# Patient Record
Sex: MALE | Race: WHITE
[De-identification: names, ages, dates, MRNs, and addresses within clinical notes are randomized per-mention and may not be internally consistent; named-entity substitution may affect disease eponyms.]

---

## 2018-10-19 ENCOUNTER — HOSPITAL ENCOUNTER (OUTPATIENT)
Dept: HOSPITAL 76 - ED | Age: 33
Discharge: HOME | End: 2018-10-19
Attending: SURGERY
Payer: COMMERCIAL

## 2018-10-19 VITALS — SYSTOLIC BLOOD PRESSURE: 124 MMHG | DIASTOLIC BLOOD PRESSURE: 64 MMHG

## 2018-10-19 DIAGNOSIS — K35.80: Primary | ICD-10-CM

## 2018-10-19 LAB
ALBUMIN DIAFP-MCNC: 4.7 G/DL (ref 3.2–5.5)
ALBUMIN/GLOB SERPL: 1.3 {RATIO} (ref 1–2.2)
ALP SERPL-CCNC: 58 IU/L (ref 42–121)
ALT SERPL W P-5'-P-CCNC: 21 IU/L (ref 10–60)
ANION GAP SERPL CALCULATED.4IONS-SCNC: 9 MMOL/L (ref 6–13)
AST SERPL W P-5'-P-CCNC: 22 IU/L (ref 10–42)
BASOPHILS NFR BLD AUTO: 0 10^3/UL (ref 0–0.1)
BASOPHILS NFR BLD AUTO: 0.4 %
BILIRUB BLD-MCNC: 0.9 MG/DL (ref 0.2–1)
BUN SERPL-MCNC: 14 MG/DL (ref 6–20)
CALCIUM UR-MCNC: 9.4 MG/DL (ref 8.5–10.3)
CHLORIDE SERPL-SCNC: 102 MMOL/L (ref 101–111)
CLARITY UR REFRACT.AUTO: CLEAR
CO2 SERPL-SCNC: 28 MMOL/L (ref 21–32)
CREAT SERPLBLD-SCNC: 0.9 MG/DL (ref 0.6–1.2)
EOSINOPHIL # BLD AUTO: 0.1 10^3/UL (ref 0–0.7)
EOSINOPHIL NFR BLD AUTO: 0.6 %
ERYTHROCYTE [DISTWIDTH] IN BLOOD BY AUTOMATED COUNT: 13.8 % (ref 12–15)
GFRSERPLBLD MDRD-ARVRAT: 97 ML/MIN/{1.73_M2} (ref 89–?)
GLOBULIN SER-MCNC: 3.7 G/DL (ref 2.1–4.2)
GLUCOSE SERPL-MCNC: 102 MG/DL (ref 70–100)
GLUCOSE UR QL STRIP.AUTO: NEGATIVE MG/DL
HGB UR QL STRIP: 15.7 G/DL (ref 14–18)
KETONES UR QL STRIP.AUTO: NEGATIVE MG/DL
LIPASE SERPL-CCNC: 25 U/L (ref 22–51)
LYMPHOCYTES # SPEC AUTO: 1.6 10^3/UL (ref 1.5–3.5)
LYMPHOCYTES NFR BLD AUTO: 16.2 %
MCH RBC QN AUTO: 31.5 PG (ref 27–31)
MCHC RBC AUTO-ENTMCNC: 34.9 G/DL (ref 32–36)
MCV RBC AUTO: 90.4 FL (ref 80–94)
MONOCYTES # BLD AUTO: 0.5 10^3/UL (ref 0–1)
MONOCYTES NFR BLD AUTO: 5.4 %
NEUTROPHILS # BLD AUTO: 7.8 10^3/UL (ref 1.5–6.6)
NEUTROPHILS # SNV AUTO: 10 X10^3/UL (ref 4.8–10.8)
NEUTROPHILS NFR BLD AUTO: 77.4 %
NITRITE UR QL STRIP.AUTO: POSITIVE
PDW BLD AUTO: 9 FL (ref 7.4–11.4)
PH UR STRIP.AUTO: 6 PH (ref 5–7.5)
PLATELET # BLD: 186 10^3/UL (ref 130–450)
PROT SPEC-MCNC: 8.4 G/DL (ref 6.7–8.2)
PROT UR STRIP.AUTO-MCNC: NEGATIVE MG/DL
RBC # UR STRIP.AUTO: NEGATIVE /UL
RBC MAR: 4.96 10^6/UL (ref 4.7–6.1)
SODIUM SERPLBLD-SCNC: 139 MMOL/L (ref 135–145)
SP GR UR STRIP.AUTO: <=1.005 (ref 1–1.03)
SQUAMOUS URNS QL MICRO: (no result)
UROBILINOGEN UR QL STRIP.AUTO: (no result) E.U./DL
UROBILINOGEN UR STRIP.AUTO-MCNC: NEGATIVE MG/DL

## 2018-10-19 PROCEDURE — 85025 COMPLETE CBC W/AUTO DIFF WBC: CPT

## 2018-10-19 PROCEDURE — 74177 CT ABD & PELVIS W/CONTRAST: CPT

## 2018-10-19 PROCEDURE — 99284 EMERGENCY DEPT VISIT MOD MDM: CPT

## 2018-10-19 PROCEDURE — 81001 URINALYSIS AUTO W/SCOPE: CPT

## 2018-10-19 PROCEDURE — 83690 ASSAY OF LIPASE: CPT

## 2018-10-19 PROCEDURE — 99283 EMERGENCY DEPT VISIT LOW MDM: CPT

## 2018-10-19 PROCEDURE — 0DTJ4ZZ RESECTION OF APPENDIX, PERCUTANEOUS ENDOSCOPIC APPROACH: ICD-10-PCS | Performed by: SURGERY

## 2018-10-19 PROCEDURE — 80053 COMPREHEN METABOLIC PANEL: CPT

## 2018-10-19 PROCEDURE — 81003 URINALYSIS AUTO W/O SCOPE: CPT

## 2018-10-19 PROCEDURE — 96365 THER/PROPH/DIAG IV INF INIT: CPT

## 2018-10-19 PROCEDURE — 87086 URINE CULTURE/COLONY COUNT: CPT

## 2018-10-19 PROCEDURE — 44970 LAPAROSCOPY APPENDECTOMY: CPT

## 2018-10-19 PROCEDURE — 36415 COLL VENOUS BLD VENIPUNCTURE: CPT

## 2018-10-19 NOTE — ED PHYSICIAN DOCUMENTATION
PD HPI ABD PAIN





- Stated complaint


Stated Complaint: ABD PX





- Chief complaint


Chief Complaint: Abd Pain





- History obtained from


History obtained from: Patient





- History of Present Illness


Timing - onset: How many days ago (2)


Timing - duration: Days (2)


Timing - details: Gradual onset


Quality: Pain


Location: RLQ


Worsened by: Moving


Associated symptoms: Fever.  No: Vomiting, Diarrhea, Dysuria


Similar symptoms before: Has not had sx before





- Additional information


Additional information: 





The patient is an otherwise healthy 32-year-old male who presents with right 

lower quadrant abdominal pain.  2 days ago he had abdominal discomfort that he 

described as "indigestion."  The pain became worse yesterday and became 

localized in the right lower quadrant.  He had associated fever.  Pain continues

this morning in the right lower quadrant.  He denies nausea, vomiting, diarrhea,

or dysuria.  The pain is worse with cough or with movement.  He last tried 

eating last night between 8 and 10 PM.  He drank a glass of water this morning 

at 7 AM. He's had no abdominal surgery.  He does not smoke cigarettes.





Review of Systems


Constitutional: reports: Fever


Nose: denies: Congestion


Throat: denies: Sore throat


Cardiac: denies: Chest pain / pressure


Respiratory: denies: Dyspnea, Cough


GI: reports: Abdominal Pain.  denies: Nausea, Vomiting, Diarrhea


: denies: Dysuria


Skin: denies: Rash


Musculoskeletal: denies: Back pain


Neurologic: denies: Focal weakness, Headache





PD PAST MEDICAL HISTORY





- Past Medical History


Cardiovascular: None


Endocrine/Autoimmune: None


GI: None





- Present Medications


Home Medications: 


                                Ambulatory Orders











 Medication  Instructions  Recorded  Confirmed


 


No Known Home Medications  10/19/18 10/19/18














- Allergies


Allergies/Adverse Reactions: 


                                    Allergies











Allergy/AdvReac Type Severity Reaction Status Date / Time


 


No Known Drug Allergies Allergy   Verified 10/19/18 09:06














PD ED PE NORMAL





- Vitals


Vital signs reviewed: Yes (borderline systolic hypertension)





- General


General: Alert and oriented X 3, Well developed/nourished





- HEENT


HEENT: Atraumatic, Moist mucous membranes, Pharynx benign





- Neck


Neck: No adenopathy, No JVD





- Cardiac


Cardiac: RRR, No murmur





- Respiratory


Respiratory: No respiratory distress, Clear bilaterally





- Abdomen


Abdomen: Normal bowel sounds, Soft, No organomegaly, Other (Tenderness to 

palpation in the right lower quadrant, without rebound.)





- Back


Back: No CVA TTP





- Derm


Derm: No rash





- Extremities


Extremities: No edema, No calf tenderness / cord





- Neuro


Neuro: Alert and oriented X 3, No motor deficit, Normal speech





Results





- Vitals


Vitals: 


                               Vital Signs - 24 hr











  10/19/18 10/19/18 10/19/18





  09:04 11:33 14:44


 


Temperature 35.8 C L 36.4 C L 37 C


 


Heart Rate 81 73 71


 


Respiratory 16 15 16





Rate   


 


Blood Pressure 138/71 H 126/66 122/81 H


 


O2 Saturation 99 98 99














  10/19/18 10/19/18 10/19/18





  14:50 14:53 14:59


 


Temperature   


 


Heart Rate  68 71


 


Respiratory  17 14





Rate   


 


Blood Pressure 135/68 H 131/69 H 133/66 H


 


O2 Saturation  100 100














  10/19/18 10/19/18 10/19/18





  15:05 15:09 15:15


 


Temperature   


 


Heart Rate 70 70 77


 


Respiratory 18 14 18





Rate   


 


Blood Pressure 129/69 126/68 126/65


 


O2 Saturation 100 100 99














  10/19/18 10/19/18 10/19/18





  15:24 15:30 15:45


 


Temperature  37 C 


 


Heart Rate 68 72 62


 


Respiratory 16 18 15





Rate   


 


Blood Pressure 119/64 126/70 127/65


 


O2 Saturation 97 98 96














  10/19/18





  16:00


 


Temperature 36.8 C


 


Heart Rate 76


 


Respiratory 15





Rate 


 


Blood Pressure 124/64


 


O2 Saturation 99








                                     Oxygen











O2 Source                      Room air

















- Labs


Labs: 


                                Laboratory Tests











  10/19/18 10/19/18 10/19/18





  09:18 09:18 09:18


 


WBC  10.0  


 


RBC  4.96  


 


Hgb  15.7  


 


Hct  44.9  


 


MCV  90.4  


 


MCH  31.5 H  


 


MCHC  34.9  


 


RDW  13.8  


 


Plt Count  186  


 


MPV  9.0  


 


Neut # (Auto)  7.8 H  


 


Lymph # (Auto)  1.6  


 


Mono # (Auto)  0.5  


 


Eos # (Auto)  0.1  


 


Baso # (Auto)  0.0  


 


Absolute Nucleated RBC  0.00  


 


Nucleated RBC %  0.0  


 


Sodium   139 


 


Potassium   3.9 


 


Chloride   102 


 


Carbon Dioxide   28 


 


Anion Gap   9.0 


 


BUN   14 


 


Creatinine   0.9 


 


Estimated GFR (MDRD)   97 


 


Glucose   102 H 


 


Calcium   9.4 


 


Total Bilirubin   0.9 


 


AST   22 


 


ALT   21 


 


Alkaline Phosphatase   58 


 


Total Protein   8.4 H 


 


Albumin   4.7 


 


Globulin   3.7 


 


Albumin/Globulin Ratio   1.3 


 


Lipase   25 


 


Urine Color    YELLOW


 


Urine Clarity    CLEAR


 


Urine pH    6.0


 


Ur Specific Gravity    <=1.005


 


Urine Protein    NEGATIVE


 


Urine Glucose (UA)    NEGATIVE


 


Urine Ketones    NEGATIVE


 


Urine Occult Blood    NEGATIVE


 


Urine Nitrite    POSITIVE H


 


Urine Bilirubin    NEGATIVE


 


Urine Urobilinogen    0.2 (NORMAL)


 


Ur Leukocyte Esterase    NEGATIVE


 


Urine RBC    None Seen


 


Urine WBC    0-3


 


Ur Squamous Epith Cells    NONE SEEN


 


Urine Bacteria    Rare


 


Ur Microscopic Review    INDICATED


 


Urine Culture Comments    INDICATED














- Rads (name of study)


  ** CT abd/pelvis


Radiology: Prelim report reviewed, EMP read contemporaneously, See rad report 

(Acute appendicitis with a 5-6 mm appendicolith in the proximal appendix. No 

abscess or free air.)





PD MEDICAL DECISION MAKING





- ED course


Complexity details: reviewed results, re-evaluated patient, considered 

differential, d/w patient, d/w consultant


ED course: 





The patient's presentation is significant for acute appendicitis, without 

abscess or perforation seen on CT scan of the abdomen.


Treatment in the emergency department included administration of normal saline 

IV, and Zosyn 3.375 g IV.  He declined treatment with pain medication or 

antiemetic.  I discussed his condition with Dr. Moffett who is on-call for 

general surgery.  He evaluated the patient in the emergency department and took 

him to the operating room for operative intervention.





Departure





- Departure


Disposition: ED Transfer to [facility] in [place]


Clinical Impression: 


Acute appendicitis


Qualifiers:


 Acute appendicitis type: unspecified acute appendicitis type Qualified Code(s):

K35.80 - Unspecified acute appendicitis





Condition: Stable


Discharge Date/Time: 10/19/18 13:42

## 2018-10-19 NOTE — CT REPORT
Reason:  RLQ abdominal pain

Procedure Date:  10/19/2018   

Accession Number:  [id number] / [id number]                    

Procedure:  CT  - Abdomen/Pelvis W/ CPT Code:  

 

FULL RESULT:

 

 

EXAM:

CT ABDOMEN AND PELVIS

 

EXAM DATE: 10/19/2018 11:10 AM.

 

CLINICAL HISTORY: Right lower quadrant pain and fever.

 

COMPARISONS: None.

 

TECHNIQUE: Routine helical CT imaging was performed through the abdomen 

and pelvis. IV contrast: ISOVUE 300  100mL. Enteric contrast: No. 

Reconstructions: Coronal and sagittal.

 

In accordance with CT protocol optimization, one or more of the following 

dose reduction techniques were utilized for this exam: automated exposure 

control, adjustment of mA and/or KV based on patient size, or use of 

iterative reconstructive technique.

 

FINDINGS:

Lung Bases: Unremarkable.

 

Liver: Normal. No masses.

 

Gallbladder/Bile Ducts: Unremarkable.

 

Spleen: Normal.

 

Pancreas: Normal.

 

Adrenal Glands: Normal.

 

Kidneys: Normal. No masses or hydronephrosis.

 

Peritoneal Cavity/Bowel: Stomach is mildly distended and unremarkable. No 

small bowel obstruction. No free air. Small fatty umbilical hernia. Small 

volume of stool in the colon. Appendix is enlarged measuring up to 10-11 

mm with appendiceal wall enhancement and periappendiceal edema. There is 

an appendicolith proximally measuring in overall length 5-6 mm. No 

evidence of periappendiceal abscess or free air.

 

Pelvic Organs: Normal. The bladder and visualized pelvic organs are 

within normal limits.

 

Vasculature: No aneurysms or other significant abnormality.

 

Bones: No significant abnormality.

 

Other: None.

IMPRESSION:

1. Acute appendicitis containing a 5-6

mm appendicolith in the proximal appendix. No evidence of periappendiceal 

abscess or free air.

 

[facility]

## 2018-10-19 NOTE — OPERATIVE REPORT
DATE OF SERVICE: 10/19/2018

Physician: Johny Moffett MD

 

PREOPERATIVE DIAGNOSIS:  Acute appendicitis.

 

POSTOPERATIVE DIAGNOSIS:  Acute appendicitis.

 

PROCEDURE PERFORMED:  Laparoscopic appendectomy.

 

INDICATIONS FOR PROCEDURE:  The patient is a 32-year-old man, otherwise healthy, who presented to the
 emergency room complaining of 2 days of abdominal pain.  He was diagnosed with acute appendicitis.

 

PROCEDURE IN DETAIL:  The risks and benefits of the procedure were explained to the patient.  He agre
ed to the procedure and was taken to the operating room and placed under general anesthesia and intub
ated.  The abdomen was prepped and draped.  A timeout was performed.  Everyone in the room agreed to 
the procedure.  He had already received antibiotics in the emergency room and was not given more in t
he OR.  We begun making a 12-mm supraumbilical incision.  We carried this down inside the peritoneal 
cavity, inserted Salud trocar and secured in place.  We insufflated the abdomen to 15 mmHg and then 
generally inspected using the camera.  No gross abnormalities were seen.  We then inserted two 5-mm t
rocars under vision of the camera, 1 above the pubic symphysis and 1 in the left lower quadrant.  The
 appendix was then identified.  It was found to be moderately inflamed in its distal half.  It was ea
sily dissected free from surrounding structures, and a window was created between the mesoappendix an
d the appendiceal lumen at the cecum.  A blue load of the ANDRAE stapler was then used to transect the a
ppendiceal body flush with the cecum, and a white load was then used to transect the mesoappendix.  T
he appendix was then placed in an EndoCatch bag.  The staple lines were examined and found to be inta
ct in good position and free of residual bleeding.  The appendix was then removed and sent to patholo
.  The 3 trocars were removed under vision of the camera.  The umbilical port site fascia was close
d, using 0 Vicryl stitch, and the skin of all 3 incisions was closed, using 4-0 Monocryl with Dermabo
nd.  Local anesthetic was infused prior to closure.  This terminated the procedure.

 

COMPLICATIONS:  None.

 

ESTIMATED BLOOD LOSS:  10 mL.  

 

SPECIMEN:  Appendix.

 

PLAN:  The plan is for the patient to go home later today.   

 

 

DD: 10/19/2018 15:51

TD: 10/19/2018 15:57

Job #: [id number]

## 2018-10-19 NOTE — ANESTHESIA
Pre-Anesthesia VS, & Labs





- Diagnosis





appendicitis





- Procedure





laparoscopic appendectomy


Vital Signs: 





                                        











Temp Pulse Resp BP Pulse Ox


 


 36.4 C L  73   15   126/66   98 


 


 10/19/18 11:33  10/19/18 11:33  10/19/18 11:33  10/19/18 11:33  10/19/18 11:33














                                        





Height                           5 ft 6 in


Weight (kg)                      65.771 kg


Body Mass Index                  23.3











- NPO


>8 hours





- Lab Results


Current Lab Results: 





Laboratory Tests





10/19/18 09:18: Sodium 139, Potassium 3.9, Chloride 102, Carbon Dioxide 28, 

Anion Gap 9.0, BUN 14, Creatinine 0.9, Estimated GFR (MDRD) 97, Glucose 102 H, 

Calcium 9.4, Total Bilirubin 0.9, AST 22, ALT 21, Alkaline Phosphatase 58, Total

Protein 8.4 H, Albumin 4.7, Globulin 3.7, Albumin/Globulin Ratio 1.3, Lipase 25


10/19/18 09:18: WBC 10.0, RBC 4.96, Hgb 15.7, Hct 44.9, MCV 90.4, MCH 31.5 H, 

MCHC 34.9, RDW 13.8, Plt Count 186, MPV 9.0, Neut # (Auto) 7.8 H, Lymph # (Auto)

1.6, Mono # (Auto) 0.5, Eos # (Auto) 0.1, Baso # (Auto) 0.0, Absolute Nucleated 

RBC 0.00, Nucleated RBC % 0.0








Fish Bones: 


                                 10/19/18 09:18





                                 10/19/18 09:18





Home Medications and Allergies


Home Medications: 


Ambulatory Orders





No Known Home Medications  10/19/18 











Active Medications





Sodium Chloride (Normal Saline 0.9%)  1,000 mls @ 250 mls/hr IV .Q4H ONE


   Stop: 10/19/18 14:41


   Last Admin: 10/19/18 12:29 Dose:  250 mls/hr





                                        





No Known Home Medications  10/19/18 








Allergies/Adverse Reactions: 


                                    Allergies











Allergy/AdvReac Type Severity Reaction Status Date / Time


 


No Known Drug Allergies Allergy   Verified 10/19/18 09:06














Anes History & Medical History





- Anesthetic History


Anesthesia Complications: reports: Post-Operative Nausea/Vomiting





- Medical History


Cardiovascular: reports: None


Gastrointestinal: reports: None


Endocrine/Autoimmune: reports: None


Smoking Status: Never smoker


Psychosocial: reports: Alcohol (2 beers some weekends)





Exam


General: Alert


Dental: WNL


Mouth Opening: Greater than 4 Fingerbreadths


Mallampati classification: III


Respiratory: Lungs clear


Cardiovascular: Regular rate, Normal S1, Normal S2





Plan


Anesthesia Type: General


Consent for Procedure(s) Verified and Reviewed: Yes


Code Status: Attempt Resuscitation


ASA classification: 1-Healthy patient


Is this case an emergency?: Yes

## 2022-01-12 ENCOUNTER — HOSPITAL ENCOUNTER (OUTPATIENT)
Dept: HOSPITAL 76 - SDS | Age: 37
Discharge: HOME | End: 2022-01-12
Attending: SURGERY
Payer: COMMERCIAL

## 2022-01-12 VITALS — DIASTOLIC BLOOD PRESSURE: 63 MMHG | SYSTOLIC BLOOD PRESSURE: 104 MMHG

## 2022-01-12 DIAGNOSIS — K43.2: Primary | ICD-10-CM

## 2022-01-12 PROCEDURE — 49560: CPT

## 2022-01-12 PROCEDURE — 49568: CPT

## 2022-01-12 NOTE — HISTORY & PHYSICAL EXAMINATION
Chief Complaint





- Chief Complaint


Chief Complaint: hernia bulge





History of Present Illness





- History Obtained From


Records Reviewed: yes


History obtained from: pt


Exam Limitations: none





- History of Present Illness


HPI Comment/Other: 





Hernia bulge cephalad of the umbilicus.  History laparoscopic appendectomy.





History





- Past Medical History


Cardiovascular: reports: None


Respiratory: reports: None


Endocrine/Autoimmune: reports: None


GI: reports: None


: reports: None


Psych: reports: None


Musculoskeletal: reports: None


Derm: reports: None


MRSA Hx?: No





- Past Surgical History


General: reports: Appendectomy





Meds/Allgy





- Home Medications


Home Medications: 


                                Ambulatory Orders











 Medication  Instructions  Recorded  Confirmed


 


Ibuprofen 200 mg PO DAILY PRN 12/29/21 12/29/21














- Allergies


Allergies/Adverse Reactions: 


                                    Allergies











Allergy/AdvReac Type Severity Reaction Status Date / Time


 


No Known Drug Allergies Allergy   Verified 10/19/18 09:06














Review of Systems





- Other Findings


Other Findings: 





10 pt ros as above otherwise unremarkable





Exam





- Vital Signs


Reviewed Vital Signs: Yes


Vital Signs: 





                                Vital Signs x48h











  Temp Pulse Resp BP Pulse Ox


 


 01/12/22 08:04  36.9 C  71  12  122/64  100














- Physical Exam


General Appearance: positive: Alert


Eyes Bilateral: positive: PERRL, EOMI


ENT: positive: No signs of dehydration


Neck: positive: No JVD


Respiratory: positive: No respiratory distress, Breath sounds nml


Cardiovascular: positive: Regular rate & rhythm


Abdomen: positive: Non-tender, No distention, Other (2 cm incisional hernia 

cephalad of his umbilicus)


Neurologic/Psychiatric: positive: Oriented x3





Conclusion/Plan





- Problem List


(1) Incisional hernia


Conclusion/Plan: 


hernia cephalad of his umbilicus.  getting worse and at times he clinically has 

to reduce small bowel.





plan open repair with mesh.  parq held and consent obtained

## 2022-01-12 NOTE — ANESTHESIA
Pre-Anesthesia VS, & Labs





- Diagnosis





ventral hernia





- Procedure





open ventral hernia repair with mesh


Vital Signs: 





                                        











Temp Pulse Resp BP Pulse Ox


 


 36.9 C   71   12   122/64   100 


 


 01/12/22 08:04  01/12/22 08:04  01/12/22 08:04  01/12/22 08:04  01/12/22 08:04











Height: 5 ft 6 in


Weight (kg): 69 kg


Body Mass Index: 24.5


BMI Classification: Healthy weight





- NPO


>8 hours





Home Medications and Allergies


Home Medications: 


Ambulatory Orders





Ibuprofen 200 mg PO DAILY PRN 12/29/21 











                                        





Ibuprofen 200 mg PO DAILY PRN 12/29/21 








Allergies/Adverse Reactions: 


                                    Allergies











Allergy/AdvReac Type Severity Reaction Status Date / Time


 


No Known Drug Allergies Allergy   Verified 10/19/18 09:06














Anes History & Medical History





- Anesthetic History


Anesthesia Complications: reports: No previous complications


Family history of Anesthesia Complications: Denies


Family history of Malignant Hyperthermia: Denies





- Medical History


Cardiovascular: reports: None


Pulmonary: reports: None


Gastrointestinal: reports: None


Urinary: reports: None


Musculoskeletal: reports: None


Endocrine/Autoimmune: reports: None


Skin: reports: None


Smoking Status: Never smoker


History of Cancer?: No





- Surgical History


General: reports: Appendectomy





Exam


General: Alert, Oriented x3, Cooperative


Dental: WNL


Mouth Opening: Greater than 4 Fingerbreadths


Neck Mobility: Normal


Mallampati classification: II


Thyromental Distance: 4-6 cm


Respiratory: Lungs clear, Normal breath sounds, No respiratory distress


Cardiovascular: Regular rate


Neurological: Normal speech


Mental/Cognitive Status: Alert/Oriented X3, Normal for patient


Cognitive Status: Within normal limits





Plan


Anesthesia Type: General


Consent for Procedure(s) Verified and Reviewed: Yes


Code Status: Attempt Resuscitation


ASA classification: 1-Healthy patient


Is this case an emergency?: No

## 2022-01-12 NOTE — ANESTHESIA POST OP EVALUATION
Anesthesia Post Eval





- Post Anesthesia Eval


Vitals: 





                                Last Vital Signs











Temp  36.5 C   01/12/22 11:15


 


Pulse  60   01/12/22 11:15


 


Resp  16   01/12/22 11:15


 


BP  104/63   01/12/22 11:15


 


Pulse Ox  100   01/12/22 11:15











CV Function Including HR & BP: Stable


Pain Control: Satisfactory


Nausea & Vomiting: Negative


Mental Status: Baseline


Respiratory Status: Airway Patent


Hydration Status: Satisfactory


Anesthesia Complications: None

## 2022-01-12 NOTE — OPERATIVE REPORT
Operative Report





- General


Procedure Date: 01/12/22


Planned Procedure: 





open repair incisional/ ventral hernia with mesh


Pre-Op Diagnosis: painful incisional hernia


Procedure Performed: 





open repair incisional hernia with mesh


Post Op Diagnosis: same





- Procedure Note


Primary Surgeon: baltazar frost


Anesthesia Technique: General LMA, Local


Pathology: 





none


Estimated Blood Loss (mL): 2


Drain/Tube Type: Other (none)


Indications: 





painful hernia bulge


Findings: 





2.5 cm incisional hernia 1 cm cephalad of a 5 mm umbilical hernia


Complications: 





none





- Other


Other Information/Narrative: 





The patient was properly identified brought to the operating room and placed in 

supine position.  Sequential compression devices were placed.  General 

anesthesia was induced.  The patient was prepped and draped in a sterile fashion

and given preoperative antibiotics.  Local anesthetic was given throughout the 

procedure.  A supraumbilical incision was made.  Incision was continued left 

lateral of the umbilicus.  Dissection proceeded sharply.  Subcutaneous tissue 

was mobilized away from the fascial defect by 2 to 3 cm in all directions.  

Umbilical skin was sharply excised away from the hernia sac or peritoneum.  The 

small bridge of fascia between the incisional hernia and the umbilical hernia 

was released.  The peritoneum was then carefully released from the fascial defe

ct edge with cutting current cautery.  A preperitoneal space was developed for 

mesh placement.  Polypropylene mesh was cut to size approximately 2 x 3 inches 

and placed preperitoneal.  The mesh was secured with 12 interrupted 0 Ethibond 

sutures.  The mesh lay in good position without tension.  Fascia was then closed

over the mesh wtih O ethibond.  Subcutaneous tissue was reapproximated with 

interrupted 2-0 Vicryl suture.  Umbilical skin was tacked back down to fascia 

with interrupted 2-0 Vicryl suture.  Buried interrupted subdermal 3-0 Vicryl 

sutures were then placed.  Skin was closed with a running 4-0 Monocryl 

subcuticular suture.  Skin glue was applied as a dressing given his rash to 

adhesives.  Patient tolerated the procedure the procedure well was awakened and 

brought to recovery in good condition.